# Patient Record
Sex: FEMALE | Employment: FULL TIME | URBAN - METROPOLITAN AREA
[De-identification: names, ages, dates, MRNs, and addresses within clinical notes are randomized per-mention and may not be internally consistent; named-entity substitution may affect disease eponyms.]

---

## 2023-04-01 DIAGNOSIS — K21.9 GASTROESOPHAGEAL REFLUX DISEASE WITHOUT ESOPHAGITIS: Primary | ICD-10-CM

## 2023-04-04 RX ORDER — PANTOPRAZOLE SODIUM 20 MG/1
TABLET, DELAYED RELEASE ORAL
Qty: 90 TABLET | Refills: 0 | Status: SHIPPED | OUTPATIENT
Start: 2023-04-04 | End: 2023-04-26

## 2023-04-24 DIAGNOSIS — K21.9 GASTROESOPHAGEAL REFLUX DISEASE WITHOUT ESOPHAGITIS: ICD-10-CM

## 2023-04-26 RX ORDER — PANTOPRAZOLE SODIUM 20 MG/1
TABLET, DELAYED RELEASE ORAL
Qty: 90 TABLET | Refills: 0 | Status: SHIPPED | OUTPATIENT
Start: 2023-04-26 | End: 2023-07-14

## 2023-07-13 ENCOUNTER — TELEPHONE (OUTPATIENT)
Dept: PRIMARY CARE | Facility: CLINIC | Age: 43
End: 2023-07-13
Payer: COMMERCIAL

## 2023-07-13 NOTE — TELEPHONE ENCOUNTER
Pt called she got a manicure Friday . Saturday her finger tip was inflammed and red now the whole finger is red and swollen. She was last seen by you 09/22/2022. Wants to know if an antibiotic would help pharmacy is [facility] listed in chart

## 2023-07-14 ENCOUNTER — OFFICE VISIT (OUTPATIENT)
Dept: PRIMARY CARE | Facility: CLINIC | Age: 43
End: 2023-07-14
Payer: COMMERCIAL

## 2023-07-14 VITALS
HEART RATE: 72 BPM | BODY MASS INDEX: 36.22 KG/M2 | SYSTOLIC BLOOD PRESSURE: 148 MMHG | HEIGHT: 68 IN | TEMPERATURE: 98 F | WEIGHT: 239 LBS | OXYGEN SATURATION: 98 % | RESPIRATION RATE: 18 BRPM | DIASTOLIC BLOOD PRESSURE: 86 MMHG

## 2023-07-14 DIAGNOSIS — M79.646 PAIN OF FINGER, UNSPECIFIED LATERALITY: Primary | ICD-10-CM

## 2023-07-14 PROCEDURE — 99213 OFFICE O/P EST LOW 20 MIN: CPT | Performed by: STUDENT IN AN ORGANIZED HEALTH CARE EDUCATION/TRAINING PROGRAM

## 2023-07-14 PROCEDURE — 1036F TOBACCO NON-USER: CPT | Performed by: STUDENT IN AN ORGANIZED HEALTH CARE EDUCATION/TRAINING PROGRAM

## 2023-07-14 RX ORDER — SERTRALINE HYDROCHLORIDE 100 MG/1
100 TABLET, FILM COATED ORAL DAILY
COMMUNITY
End: 2023-07-14

## 2023-07-14 RX ORDER — ARIPIPRAZOLE 5 MG/1
5 TABLET ORAL DAILY
COMMUNITY
End: 2024-02-22 | Stop reason: WASHOUT

## 2023-07-14 RX ORDER — ACYCLOVIR 200 MG/1
CAPSULE ORAL
COMMUNITY
Start: 2014-10-01 | End: 2024-03-08 | Stop reason: SDUPTHER

## 2023-07-14 RX ORDER — BUSPIRONE HYDROCHLORIDE 10 MG/1
TABLET ORAL
COMMUNITY

## 2023-07-14 ASSESSMENT — PAIN SCALES - GENERAL: PAINLEVEL: 0-NO PAIN

## 2023-07-14 NOTE — PROGRESS NOTES
"Subjective   Reason for Visit: Delicia Vazquez is an 43 y.o. female here for a Medicare Wellness visit.               HPI    Right finger: Patient states that she was having a manicure.  No significant her nail.  This got a red and some painful.  It was swollen at times.  This happened 3 days ago.  Gone down to presents no drainage.  Looks pretty fine does have some joint pain no tenderness redness or erythema was noted.    Patient Care Team:  Natalia Balderas DO as PCP - General  Natalia Balderas DO as PCP - MMO [facility] PCP     Review of Systems   All other systems reviewed and are negative.      Objective   Vitals:  /86   Pulse 72   Temp 36.7 °C (98 °F)   Resp 18   Ht 1.727 m (5' 8\")   Wt 108 kg (239 lb)   SpO2 98%   BMI 36.34 kg/m²       Physical Exam  Constitutional:       Appearance: Normal appearance.   HENT:      Head: Normocephalic and atraumatic.      Right Ear: Tympanic membrane and ear canal normal.      Left Ear: Tympanic membrane and ear canal normal.      Mouth/Throat:      Mouth: Mucous membranes are moist.      Pharynx: Oropharynx is clear.   Eyes:      Extraocular Movements: Extraocular movements intact.      Conjunctiva/sclera: Conjunctivae normal.      Pupils: Pupils are equal, round, and reactive to light.   Cardiovascular:      Rate and Rhythm: Normal rate and regular rhythm.      Pulses: Normal pulses.      Heart sounds: Normal heart sounds.   Pulmonary:      Effort: Pulmonary effort is normal.      Breath sounds: Normal breath sounds.   Abdominal:      General: Abdomen is flat. Bowel sounds are normal.      Palpations: Abdomen is soft.   Musculoskeletal:         General: Normal range of motion.      Cervical back: Normal range of motion and neck supple.   Skin:     General: Skin is warm and dry.      Capillary Refill: Capillary refill takes 2 to 3 seconds.   Neurological:      General: No focal deficit present.      Mental Status: She is alert and oriented to person, place, and time. " Mental status is at baseline.   Psychiatric:         Mood and Affect: Mood normal.         Behavior: Behavior normal.         Thought Content: Thought content normal.         Judgment: Judgment normal.         Assessment/Plan   1. Pain of finger, unspecified laterality

## 2023-11-06 DIAGNOSIS — Z30.41 ENCOUNTER FOR SURVEILLANCE OF CONTRACEPTIVE PILLS: ICD-10-CM

## 2023-11-06 RX ORDER — NORETHINDRONE 0.35 MG/1
1 TABLET ORAL DAILY
Qty: 28 TABLET | Refills: 2 | Status: SHIPPED | OUTPATIENT
Start: 2023-11-06 | End: 2024-02-22 | Stop reason: WASHOUT

## 2023-11-06 RX ORDER — NORETHINDRONE 0.35 MG/1
1 TABLET ORAL DAILY
COMMUNITY
End: 2023-11-06 | Stop reason: SDUPTHER

## 2023-11-06 NOTE — TELEPHONE ENCOUNTER
Pt. Has her annual schedule for 2/7/2024 with Dr. Tillman. She is asking for a refill of her B/C enough to get her to her appointment. She in on [address]. Her pharmacy is [facility].

## 2023-12-13 DIAGNOSIS — K21.9 GASTROESOPHAGEAL REFLUX DISEASE WITHOUT ESOPHAGITIS: ICD-10-CM

## 2023-12-13 RX ORDER — PANTOPRAZOLE SODIUM 20 MG/1
TABLET, DELAYED RELEASE ORAL
Qty: 90 TABLET | Refills: 0 | Status: SHIPPED | OUTPATIENT
Start: 2023-12-13 | End: 2024-02-22 | Stop reason: SDUPTHER

## 2024-02-01 ENCOUNTER — TELEPHONE (OUTPATIENT)
Dept: OBSTETRICS AND GYNECOLOGY | Facility: CLINIC | Age: 44
End: 2024-02-01
Payer: COMMERCIAL

## 2024-02-06 ENCOUNTER — APPOINTMENT (OUTPATIENT)
Dept: OBSTETRICS AND GYNECOLOGY | Facility: CLINIC | Age: 44
End: 2024-02-06
Payer: COMMERCIAL

## 2024-02-07 ENCOUNTER — APPOINTMENT (OUTPATIENT)
Dept: OBSTETRICS AND GYNECOLOGY | Facility: CLINIC | Age: 44
End: 2024-02-07
Payer: COMMERCIAL

## 2024-02-21 ASSESSMENT — PROMIS GLOBAL HEALTH SCALE
CARRYOUT_SOCIAL_ACTIVITIES: EXCELLENT
CARRYOUT_PHYSICAL_ACTIVITIES: COMPLETELY
RATE_PHYSICAL_HEALTH: GOOD
RATE_AVERAGE_PAIN: 1
RATE_MENTAL_HEALTH: GOOD
RATE_QUALITY_OF_LIFE: VERY GOOD
RATE_SOCIAL_SATISFACTION: VERY GOOD
RATE_GENERAL_HEALTH: GOOD
EMOTIONAL_PROBLEMS: SOMETIMES

## 2024-02-22 ENCOUNTER — OFFICE VISIT (OUTPATIENT)
Dept: PRIMARY CARE | Facility: CLINIC | Age: 44
End: 2024-02-22
Payer: COMMERCIAL

## 2024-02-22 VITALS
WEIGHT: 257 LBS | DIASTOLIC BLOOD PRESSURE: 88 MMHG | OXYGEN SATURATION: 99 % | HEIGHT: 68 IN | HEART RATE: 90 BPM | SYSTOLIC BLOOD PRESSURE: 130 MMHG | BODY MASS INDEX: 38.95 KG/M2

## 2024-02-22 DIAGNOSIS — K21.9 GASTROESOPHAGEAL REFLUX DISEASE WITHOUT ESOPHAGITIS: ICD-10-CM

## 2024-02-22 DIAGNOSIS — Z13.220 LIPID SCREENING: ICD-10-CM

## 2024-02-22 DIAGNOSIS — Z12.31 BREAST CANCER SCREENING BY MAMMOGRAM: ICD-10-CM

## 2024-02-22 DIAGNOSIS — Z13.29 THYROID DISORDER SCREENING: ICD-10-CM

## 2024-02-22 DIAGNOSIS — Z00.00 ANNUAL PHYSICAL EXAM: Primary | ICD-10-CM

## 2024-02-22 DIAGNOSIS — R73.9 HYPERGLYCEMIA: ICD-10-CM

## 2024-02-22 LAB
ALBUMIN SERPL BCP-MCNC: 4.7 G/DL (ref 3.4–5)
ALP SERPL-CCNC: 47 U/L (ref 33–110)
ALT SERPL W P-5'-P-CCNC: 47 U/L (ref 7–45)
ANION GAP SERPL CALC-SCNC: 18 MMOL/L (ref 10–20)
AST SERPL W P-5'-P-CCNC: 37 U/L (ref 9–39)
BILIRUB SERPL-MCNC: 0.6 MG/DL (ref 0–1.2)
BUN SERPL-MCNC: 12 MG/DL (ref 6–23)
CALCIUM SERPL-MCNC: 9.2 MG/DL (ref 8.6–10.6)
CHLORIDE SERPL-SCNC: 101 MMOL/L (ref 98–107)
CHOLEST SERPL-MCNC: 213 MG/DL (ref 0–199)
CHOLESTEROL/HDL RATIO: 5.1
CO2 SERPL-SCNC: 24 MMOL/L (ref 21–32)
CREAT SERPL-MCNC: 0.66 MG/DL (ref 0.5–1.05)
EGFRCR SERPLBLD CKD-EPI 2021: >90 ML/MIN/1.73M*2
ERYTHROCYTE [DISTWIDTH] IN BLOOD BY AUTOMATED COUNT: 15.9 % (ref 11.5–14.5)
EST. AVERAGE GLUCOSE BLD GHB EST-MCNC: 114 MG/DL
GLUCOSE SERPL-MCNC: 86 MG/DL (ref 74–99)
HBA1C MFR BLD: 5.6 %
HCT VFR BLD AUTO: 39.2 % (ref 36–46)
HDLC SERPL-MCNC: 41.8 MG/DL
HGB BLD-MCNC: 12.6 G/DL (ref 12–16)
LDLC SERPL CALC-MCNC: 139 MG/DL
MCH RBC QN AUTO: 27 PG (ref 26–34)
MCHC RBC AUTO-ENTMCNC: 32.1 G/DL (ref 32–36)
MCV RBC AUTO: 84 FL (ref 80–100)
NON HDL CHOLESTEROL: 171 MG/DL (ref 0–149)
NRBC BLD-RTO: 0 /100 WBCS (ref 0–0)
PLATELET # BLD AUTO: 294 X10*3/UL (ref 150–450)
POTASSIUM SERPL-SCNC: 4.5 MMOL/L (ref 3.5–5.3)
PROT SERPL-MCNC: 7.2 G/DL (ref 6.4–8.2)
RBC # BLD AUTO: 4.66 X10*6/UL (ref 4–5.2)
SODIUM SERPL-SCNC: 138 MMOL/L (ref 136–145)
TRIGL SERPL-MCNC: 162 MG/DL (ref 0–149)
TSH SERPL-ACNC: 2.09 MIU/L (ref 0.44–3.98)
VLDL: 32 MG/DL (ref 0–40)
WBC # BLD AUTO: 6.8 X10*3/UL (ref 4.4–11.3)

## 2024-02-22 PROCEDURE — 85027 COMPLETE CBC AUTOMATED: CPT

## 2024-02-22 PROCEDURE — 1036F TOBACCO NON-USER: CPT | Performed by: STUDENT IN AN ORGANIZED HEALTH CARE EDUCATION/TRAINING PROGRAM

## 2024-02-22 PROCEDURE — 3008F BODY MASS INDEX DOCD: CPT | Performed by: STUDENT IN AN ORGANIZED HEALTH CARE EDUCATION/TRAINING PROGRAM

## 2024-02-22 PROCEDURE — 36415 COLL VENOUS BLD VENIPUNCTURE: CPT

## 2024-02-22 PROCEDURE — 83036 HEMOGLOBIN GLYCOSYLATED A1C: CPT

## 2024-02-22 PROCEDURE — 80061 LIPID PANEL: CPT

## 2024-02-22 PROCEDURE — 99396 PREV VISIT EST AGE 40-64: CPT | Performed by: STUDENT IN AN ORGANIZED HEALTH CARE EDUCATION/TRAINING PROGRAM

## 2024-02-22 PROCEDURE — 80053 COMPREHEN METABOLIC PANEL: CPT

## 2024-02-22 PROCEDURE — 84443 ASSAY THYROID STIM HORMONE: CPT

## 2024-02-22 RX ORDER — PANTOPRAZOLE SODIUM 20 MG/1
20 TABLET, DELAYED RELEASE ORAL DAILY
Qty: 90 TABLET | Refills: 3 | Status: SHIPPED | OUTPATIENT
Start: 2024-02-22

## 2024-02-22 RX ORDER — BUPROPION HYDROCHLORIDE 150 MG/1
150 TABLET ORAL DAILY
COMMUNITY

## 2024-02-22 RX ORDER — DESVENLAFAXINE SUCCINATE 50 MG/1
50 TABLET, EXTENDED RELEASE ORAL DAILY
COMMUNITY
Start: 2024-02-13

## 2024-02-22 ASSESSMENT — ENCOUNTER SYMPTOMS
PALPITATIONS: 0
FATIGUE: 0
UNEXPECTED WEIGHT CHANGE: 0
WHEEZING: 0
SHORTNESS OF BREATH: 0
ABDOMINAL PAIN: 0
LIGHT-HEADEDNESS: 0
SINUS PRESSURE: 0
SLEEP DISTURBANCE: 0
DYSPHORIC MOOD: 0
CONSTIPATION: 0
DIARRHEA: 0
CHEST TIGHTNESS: 0
NERVOUS/ANXIOUS: 0
DIZZINESS: 0
HEADACHES: 0
DIFFICULTY URINATING: 0

## 2024-02-22 ASSESSMENT — PATIENT HEALTH QUESTIONNAIRE - PHQ9
1. LITTLE INTEREST OR PLEASURE IN DOING THINGS: NOT AT ALL
SUM OF ALL RESPONSES TO PHQ9 QUESTIONS 1 AND 2: 0
2. FEELING DOWN, DEPRESSED OR HOPELESS: NOT AT ALL

## 2024-02-22 NOTE — PROGRESS NOTES
Subjective   Patient ID: Delicia Vazquez is a 43 y.o. female who presents for Annual Exam (Annual physical /Fasting bloodwork- Would like it forwarded to psych Ryder trimble /She has fax # to provide to me /Is running out of birth control- Is not sexually active, is thinking about just stopping it. Wants your advice if she can stop taking it. ).  Working on weight loss. Her mental health is very connected with her eating habits.     Fasting for labs.     Sees a psychiatrist. Did Tempus testing and is now on pristiq and wellbutrin. Going well so far.     Interested in seeing how she does off of birth control.     Sleep is good.     Exercises regularly.     No family hx CAD.     Chronic dry nose.     No family hx of colon or breast cancer.         Review of Systems   Constitutional:  Negative for fatigue and unexpected weight change.   HENT:  Negative for congestion, ear pain and sinus pressure.    Eyes:  Negative for visual disturbance.   Respiratory:  Negative for chest tightness, shortness of breath and wheezing.    Cardiovascular:  Negative for chest pain, palpitations and leg swelling.   Gastrointestinal:  Negative for abdominal pain, constipation and diarrhea.   Genitourinary:  Negative for difficulty urinating.   Skin:  Negative for rash.   Neurological:  Negative for dizziness, light-headedness and headaches.   Psychiatric/Behavioral:  Negative for dysphoric mood and sleep disturbance. The patient is not nervous/anxious.    All other systems reviewed and are negative.      Objective   Physical Exam  Vitals reviewed.   Constitutional:       General: She is not in acute distress.  HENT:      Head: Normocephalic.      Right Ear: External ear normal.      Left Ear: External ear normal.      Nose: Nose normal.   Eyes:      Extraocular Movements: Extraocular movements intact.      Pupils: Pupils are equal, round, and reactive to light.   Cardiovascular:      Rate and Rhythm: Normal rate and regular rhythm.      Heart  sounds: Normal heart sounds.   Pulmonary:      Effort: Pulmonary effort is normal.      Breath sounds: Normal breath sounds.   Abdominal:      Palpations: Abdomen is soft.      Tenderness: There is no abdominal tenderness. There is no guarding.   Musculoskeletal:         General: Normal range of motion.      Cervical back: Normal range of motion and neck supple.   Skin:     General: Skin is warm and dry.   Neurological:      Mental Status: She is alert. Mental status is at baseline.   Psychiatric:         Mood and Affect: Mood normal.         Behavior: Behavior normal.         Body mass index is 39.08 kg/m².      Current Outpatient Medications:     acyclovir (Zovirax) 200 mg capsule, Take by mouth once daily., Disp: , Rfl:     buPROPion XL (Wellbutrin XL) 150 mg 24 hr tablet, Take 1 tablet (150 mg) by mouth once daily., Disp: , Rfl:     busPIRone (Buspar) 10 mg tablet, TAKE 1 TABLET BY MOUTH 3 TIMES A DAY AS DIRECTED, Disp: , Rfl:     cetirizine (ZyrTEC) 10 mg capsule, Take 1 capsule (10 mg) by mouth once daily., Disp: , Rfl:     desvenlafaxine 50 mg 24 hr tablet, Take 1 tablet (50 mg) by mouth once daily., Disp: , Rfl:     pantoprazole (ProtoNix) 20 mg EC tablet, Take 1 tablet (20 mg) by mouth once daily. Do not crush, chew, or split., Disp: 90 tablet, Rfl: 3      Assessment/Plan   Diagnoses and all orders for this visit:  Annual physical exam  Comments:  mammogram ordered  cologuard next year  gyn appt soon for pap  Orders:  -     Comprehensive Metabolic Panel  -     CBC  Gastroesophageal reflux disease without esophagitis  -     pantoprazole (ProtoNix) 20 mg EC tablet; Take 1 tablet (20 mg) by mouth once daily. Do not crush, chew, or split.  Lipid screening  -     Lipid Panel  Thyroid disorder screening  -     TSH with reflex to Free T4 if abnormal  Breast cancer screening by mammogram  -     BI mammo bilateral screening tomosynthesis; Future  Hyperglycemia  -     Hemoglobin A1c  BMI 39.0-39.9,adult    Follow up  annually       Natalia Balderas,  02/22/24 9:09 AM

## 2024-03-06 ENCOUNTER — HOSPITAL ENCOUNTER (OUTPATIENT)
Dept: RADIOLOGY | Facility: CLINIC | Age: 44
Discharge: HOME | End: 2024-03-06
Payer: COMMERCIAL

## 2024-03-06 DIAGNOSIS — Z12.31 BREAST CANCER SCREENING BY MAMMOGRAM: ICD-10-CM

## 2024-03-06 PROCEDURE — 77067 SCR MAMMO BI INCL CAD: CPT

## 2024-03-06 PROCEDURE — 77067 SCR MAMMO BI INCL CAD: CPT | Performed by: RADIOLOGY

## 2024-03-06 PROCEDURE — 77063 BREAST TOMOSYNTHESIS BI: CPT | Performed by: RADIOLOGY

## 2024-03-08 ENCOUNTER — PATIENT MESSAGE (OUTPATIENT)
Dept: PRIMARY CARE | Facility: CLINIC | Age: 44
End: 2024-03-08
Payer: COMMERCIAL

## 2024-03-08 DIAGNOSIS — Z86.19 H/O COLD SORES: Primary | ICD-10-CM

## 2024-03-08 RX ORDER — ACYCLOVIR 200 MG/1
200 CAPSULE ORAL
Qty: 90 CAPSULE | Refills: 0 | Status: SHIPPED | OUTPATIENT
Start: 2024-03-08 | End: 2024-05-29 | Stop reason: SDUPTHER

## 2024-03-08 NOTE — TELEPHONE ENCOUNTER
From: Fabrice Vazquez  To: Natalia Balderas DO  Sent: 3/8/2024 10:12 AM EST  Subject: Request refill acyclovir 200mg    Hello can you please call in a refill of my acyclovir 200mg. My last rx refill  date  in 2023. Thank you! Pharmacy is [facility] in [facility].    ~fabrice

## 2024-04-03 ENCOUNTER — APPOINTMENT (OUTPATIENT)
Dept: OBSTETRICS AND GYNECOLOGY | Facility: CLINIC | Age: 44
End: 2024-04-03
Payer: COMMERCIAL

## 2024-05-09 ENCOUNTER — APPOINTMENT (OUTPATIENT)
Dept: OBSTETRICS AND GYNECOLOGY | Facility: CLINIC | Age: 44
End: 2024-05-09
Payer: COMMERCIAL

## 2024-05-29 ENCOUNTER — OFFICE VISIT (OUTPATIENT)
Dept: OBSTETRICS AND GYNECOLOGY | Facility: CLINIC | Age: 44
End: 2024-05-29
Payer: COMMERCIAL

## 2024-05-29 ENCOUNTER — LAB (OUTPATIENT)
Dept: LAB | Facility: LAB | Age: 44
End: 2024-05-29
Payer: COMMERCIAL

## 2024-05-29 VITALS
DIASTOLIC BLOOD PRESSURE: 84 MMHG | HEIGHT: 68 IN | WEIGHT: 237 LBS | BODY MASS INDEX: 35.92 KG/M2 | SYSTOLIC BLOOD PRESSURE: 148 MMHG

## 2024-05-29 DIAGNOSIS — Z12.31 VISIT FOR SCREENING MAMMOGRAM: ICD-10-CM

## 2024-05-29 DIAGNOSIS — Z11.3 SCREENING EXAMINATION FOR STI: Primary | ICD-10-CM

## 2024-05-29 DIAGNOSIS — Z11.3 SCREENING EXAMINATION FOR STI: ICD-10-CM

## 2024-05-29 DIAGNOSIS — Z86.19 H/O COLD SORES: ICD-10-CM

## 2024-05-29 DIAGNOSIS — Z01.419 WELL FEMALE EXAM WITH ROUTINE GYNECOLOGICAL EXAM: ICD-10-CM

## 2024-05-29 PROCEDURE — 87591 N.GONORRHOEAE DNA AMP PROB: CPT

## 2024-05-29 PROCEDURE — 87491 CHLMYD TRACH DNA AMP PROBE: CPT

## 2024-05-29 PROCEDURE — 87661 TRICHOMONAS VAGINALIS AMPLIF: CPT

## 2024-05-29 PROCEDURE — 99396 PREV VISIT EST AGE 40-64: CPT | Performed by: OBSTETRICS & GYNECOLOGY

## 2024-05-29 PROCEDURE — 86803 HEPATITIS C AB TEST: CPT

## 2024-05-29 PROCEDURE — 86780 TREPONEMA PALLIDUM: CPT

## 2024-05-29 PROCEDURE — 88175 CYTOPATH C/V AUTO FLUID REDO: CPT

## 2024-05-29 PROCEDURE — 87340 HEPATITIS B SURFACE AG IA: CPT

## 2024-05-29 PROCEDURE — 36415 COLL VENOUS BLD VENIPUNCTURE: CPT

## 2024-05-29 PROCEDURE — 3008F BODY MASS INDEX DOCD: CPT | Performed by: OBSTETRICS & GYNECOLOGY

## 2024-05-29 PROCEDURE — 87389 HIV-1 AG W/HIV-1&-2 AB AG IA: CPT

## 2024-05-29 PROCEDURE — 87624 HPV HI-RISK TYP POOLED RSLT: CPT

## 2024-05-29 RX ORDER — ACYCLOVIR 200 MG/1
200 CAPSULE ORAL
Qty: 90 CAPSULE | Refills: 3 | Status: SHIPPED | OUTPATIENT
Start: 2024-05-29

## 2024-05-29 NOTE — PROGRESS NOTES
Subjective   Patient ID: Delicia Vazquez is a 44 y.o. female who presents for Annual Exam.    Last pap: 9/2/20 normal HPV- ([facility])  Last mamm: 3/6/24, ordered for next year  LMP: 05/06/2024. Menses reg   Contraception: condoms  Sexually active: yes  Self breast exam: yes  Diet: balanced  Exercise: yes    HPI  Doing well. No complaints.  Wants all sti screening and refill herpes meds      Review of Systems  Neg   Objective   Physical Exam    Physical Exam         Appearance: Normal appearance. Affect normal and alert  Pulmonary:      Effort: Pulmonary effort is normal. Breath sounds clear  Skin: no rashes or lesions   Breasts:     Breasts bilaterally are symmetrical. No masses or axillary adenopathy. No skin or nipple changes    Abdominal:     Abdomen is flat, soft, nontender. No distension. No mass palpated.      Genitourinary:     Labia: no skin lesions or rash       Urethra: No lesions.      Bladder with no prolapse     Vagina: No discharge, mucosa is pink with no lesions.     Cervix:    mobile and nontender no discharge     Uterus:   Not enlarged, small, nontender.      Adnexa: Bilaterally with  No mass or tenderness.            Extremities:  Nontender, no edema. Normal range of motion    Assessment/Plan   Diagnoses and all orders for this visit:  Screening examination for STI  -     Syphilis Screen with Reflex; Future  -     HIV 1/2 Antigen/Antibody Screen with Reflex to Confirmation; Future  -     Hepatitis BsAg; Future  -     Hepatitis C Antibody; Future  Visit for screening mammogram  -     BI mammo bilateral screening tomosynthesis; Future  Well female exam with routine gynecological exam  -     THINPREP PAP/ GC CHLY   H/O cold sores  -     acyclovir (Zovirax) 200 mg capsule; Take 1 capsule (200 mg) by mouth once daily.         MD Ivana Harris, [facility] 05/29/24 3:02 PM

## 2024-05-30 LAB
HBV SURFACE AG SERPL QL IA: NONREACTIVE
HCV AB SER QL: NONREACTIVE
HIV 1+2 AB+HIV1 P24 AG SERPL QL IA: NONREACTIVE
TREPONEMA PALLIDUM IGG+IGM AB [PRESENCE] IN SERUM OR PLASMA BY IMMUNOASSAY: NONREACTIVE

## 2024-05-31 LAB
C TRACH RRNA SPEC QL NAA+PROBE: NEGATIVE
N GONORRHOEA DNA SPEC QL PROBE+SIG AMP: NEGATIVE
T VAGINALIS RRNA SPEC QL NAA+PROBE: NEGATIVE

## 2024-06-11 LAB
CYTOLOGY CMNT CVX/VAG CYTO-IMP: NORMAL
HPV HR 12 DNA GENITAL QL NAA+PROBE: POSITIVE
HPV HR GENOTYPES PNL CVX NAA+PROBE: POSITIVE
HPV16 DNA SPEC QL NAA+PROBE: NEGATIVE
HPV18 DNA SPEC QL NAA+PROBE: POSITIVE
LAB AP HPV GENOTYPE QUESTION: YES
LAB AP HPV HR: NORMAL
LAB AP PAP ADDITIONAL TESTS: NORMAL
LABORATORY COMMENT REPORT: NORMAL
PATH REPORT.TOTAL CANCER: NORMAL

## 2024-06-16 ENCOUNTER — PATIENT MESSAGE (OUTPATIENT)
Dept: OBSTETRICS AND GYNECOLOGY | Facility: CLINIC | Age: 44
End: 2024-06-16
Payer: COMMERCIAL

## 2024-06-16 DIAGNOSIS — L73.9 FOLLICULITIS: Primary | ICD-10-CM

## 2024-06-20 ENCOUNTER — TELEPHONE (OUTPATIENT)
Dept: OBSTETRICS AND GYNECOLOGY | Facility: CLINIC | Age: 44
End: 2024-06-20
Payer: COMMERCIAL

## 2024-06-20 NOTE — TELEPHONE ENCOUNTER
----- Message from Kristy Hung MD sent at 6/19/2024  9:31 PM EDT -----    Please call this patient. Her PAP results were abnormal. She needs to make an appointment for a colposcopy.        Kristy Hung MD

## 2024-06-26 ENCOUNTER — TELEPHONE (OUTPATIENT)
Dept: OBSTETRICS AND GYNECOLOGY | Facility: CLINIC | Age: 44
End: 2024-06-26
Payer: COMMERCIAL

## 2024-07-01 ENCOUNTER — TELEPHONE (OUTPATIENT)
Dept: OBSTETRICS AND GYNECOLOGY | Facility: CLINIC | Age: 44
End: 2024-07-01
Payer: COMMERCIAL

## 2024-07-01 RX ORDER — CEPHALEXIN 500 MG/1
500 CAPSULE ORAL 2 TIMES DAILY
Qty: 14 CAPSULE | Refills: 0 | Status: SHIPPED | OUTPATIENT
Start: 2024-07-01 | End: 2024-07-08

## 2024-07-01 NOTE — TELEPHONE ENCOUNTER
----- Message from Kristy Hung MD sent at 7/1/2024  2:08 PM EDT -----  Regarding: FW: Bump/cyst found  Contact: [phone]  To start Keflex today 500 BID rx sent    Kristy Hung MD    ----- Message -----  From: Ivana Bender CMA  Sent: 7/1/2024   7:13 AM EDT  To: Kristy Hung MD  Subject: FW: Bump/cyst found                              Does she need to be seen before 7/9/24?  ----- Message -----  From: Delicia Vazquez  Sent: 6/29/2024   9:11 PM EDT  To:  [address] Clinical Support Staff  Subject: Bump/cyst found                                  Hello,  I’m trying to figure out if I need to seek immediate medical attention for this cyst. I have been soaking and applying warm compresses and tea bags. I don’t have an appointment with Dr hung until July 9th. Can you advise please?

## 2024-07-01 NOTE — TELEPHONE ENCOUNTER
----- Message from Kristy Hung MD sent at 7/1/2024  2:08 PM EDT -----  Regarding: FW: Bump/cyst found  Contact: [phone]  To start Keflex today 500 BID rx sent    Kristy Hung MD    ----- Message -----  From: Ivana Bender CMA  Sent: 7/1/2024   7:13 AM EDT  To: Kristy Hung MD  Subject: FW: Bump/cyst found                              Does she need to be seen before 7/9/24?  ----- Message -----  From: Delicia Vazquez  Sent: 6/29/2024   9:11 PM EDT  To:  [address] Clinical Support Staff  Subject: Bump/cyst found                                  Hello,  I’m trying to figure out if I need to seek immediate medical attention for this cyst. I have been soaking and applying warm compresses and tea bags. I don’t have an appointment with Dr hung until July 9th. Can you advise please?

## 2024-07-09 ENCOUNTER — APPOINTMENT (OUTPATIENT)
Dept: OBSTETRICS AND GYNECOLOGY | Facility: CLINIC | Age: 44
End: 2024-07-09
Payer: COMMERCIAL

## 2024-07-09 VITALS
SYSTOLIC BLOOD PRESSURE: 158 MMHG | HEIGHT: 68 IN | WEIGHT: 245.4 LBS | BODY MASS INDEX: 37.19 KG/M2 | DIASTOLIC BLOOD PRESSURE: 90 MMHG

## 2024-07-09 DIAGNOSIS — L73.9 FOLLICULITIS: ICD-10-CM

## 2024-07-09 DIAGNOSIS — B97.7 HPV IN FEMALE: ICD-10-CM

## 2024-07-09 DIAGNOSIS — R87.810 HUMAN PAPILLOMAVIRUS (HPV) TYPE 18 DNA DETECTED IN CERVICAL SPECIMEN: Primary | ICD-10-CM

## 2024-07-09 DIAGNOSIS — N83.201 CYST OF RIGHT OVARY: ICD-10-CM

## 2024-07-09 LAB — PREGNANCY TEST URINE, POC: NEGATIVE

## 2024-07-09 PROCEDURE — 88305 TISSUE EXAM BY PATHOLOGIST: CPT

## 2024-07-09 PROCEDURE — 81025 URINE PREGNANCY TEST: CPT | Performed by: OBSTETRICS & GYNECOLOGY

## 2024-07-09 PROCEDURE — 99213 OFFICE O/P EST LOW 20 MIN: CPT | Performed by: OBSTETRICS & GYNECOLOGY

## 2024-07-09 PROCEDURE — 57454 BX/CURETT OF CERVIX W/SCOPE: CPT | Performed by: OBSTETRICS & GYNECOLOGY

## 2024-07-09 NOTE — PROGRESS NOTES
Patient ID: Delicia Vazquez is a 44 y.o. female. Pap on 5/29/24 normal HPV+(18 and other).  Had trt x 7 days for folliculitis on rt vulva-  exam: this has healed.    [facility] week for RLQ pain. CT scan reviewed.  = 4 cm cyst in ovary. Appendix was normal.   Pt on Naproxen .   Cyst will shrink after 1-2 menses. Call if severe pain recurs.        Colposcopy    Date/Time: 7/9/2024 12:56 PM    Performed by: Kristy Hung MD  Authorized by: Kristy Hung MD    Consent:     Patient questions answered: yes      Risks and benefits of the procedure and its alternatives discussed: yes      Consent obtained:  Written and verbal    Consent given by:  Patient  Indication:     Other indication(s): clinical abnormality    Pre-procedure:     Speculum was placed in the vagina: yes      Prep solution(s): acetic acid    Procedure:     Colposcopy with: colposcopy only and endocervical curettage      Colposcopy details:  No lesions seen     Cervix visibility: fully visualized      SCJ visibility: fully visualized    Post-procedure:     Patient tolerance of procedure:  Patient tolerated the procedure well with no immediate complications    Instructions and paperwork completed: yes    Comments:      Follow up Pap in 1 yr due to HPV 18 positive.       Kristy Hung MD

## 2024-07-16 LAB
LABORATORY COMMENT REPORT: NORMAL
PATH REPORT.FINAL DX SPEC: NORMAL
PATH REPORT.GROSS SPEC: NORMAL
PATH REPORT.RELEVANT HX SPEC: NORMAL
PATH REPORT.TOTAL CANCER: NORMAL

## 2024-09-11 ENCOUNTER — APPOINTMENT (OUTPATIENT)
Dept: OBSTETRICS AND GYNECOLOGY | Facility: CLINIC | Age: 44
End: 2024-09-11
Payer: COMMERCIAL

## 2024-09-27 ENCOUNTER — OFFICE VISIT (OUTPATIENT)
Dept: URGENT CARE | Age: 44
End: 2024-09-27
Payer: COMMERCIAL

## 2024-09-27 VITALS
RESPIRATION RATE: 14 BRPM | WEIGHT: 240 LBS | OXYGEN SATURATION: 100 % | TEMPERATURE: 97.5 F | BODY MASS INDEX: 36.49 KG/M2 | HEART RATE: 76 BPM | DIASTOLIC BLOOD PRESSURE: 91 MMHG | SYSTOLIC BLOOD PRESSURE: 178 MMHG

## 2024-09-27 DIAGNOSIS — H92.01 OTALGIA OF RIGHT EAR: Primary | ICD-10-CM

## 2024-09-27 ASSESSMENT — PAIN SCALES - GENERAL: PAINLEVEL: 3

## 2024-09-27 NOTE — PROGRESS NOTES
Subjective   Patient ID: Delicia Vazquez is a 44 y.o. female. They present today with a chief complaint of Foreign Body in Ear (Cotton part of Q-tip stuck in (R) ear X 3 days. ).    History of Present Illness    Foreign Body in Ear  Associated symptoms: ear pain        Patient reports possible foreign body in right ear. She states she cleaned out her ears three days ago and believed the cotton part of Q tip is stuck in her ear.     Past Medical History  Allergies as of 09/27/2024 - Reviewed 09/27/2024   Allergen Reaction Noted    Penicillins Rash 10/01/2014       (Not in a hospital admission)       Past Medical History:   Diagnosis Date    Acute pharyngitis, unspecified 03/01/2022    Pharyngitis with viral syndrome    Acute serous otitis media, bilateral 12/02/2019    Bilateral acute serous otitis media    Acute upper respiratory infection, unspecified 12/02/2019    Acute upper respiratory infection    COVID-19 12/15/2021    COVID-19 virus infection    Left lower quadrant pain 08/17/2019    LLQ discomfort    Morbid (severe) obesity due to excess calories (Multi) 09/19/2022    Class 3 severe obesity with body mass index (BMI) of 40.0 to 44.9 in adult, unspecified obesity type, unspecified whether serious comorbidity present    Other acute sinusitis 05/23/2017    Other acute sinusitis    Postnasal drip 03/01/2022    Post-nasal drainage    Unspecified symptoms and signs involving the genitourinary system 08/17/2019    UTI symptoms       Past Surgical History:   Procedure Laterality Date    OTHER SURGICAL HISTORY  03/23/2015    Primary Repair Of Ruptured Achilles Tendon        reports that she has never smoked. She has never been exposed to tobacco smoke. She has never used smokeless tobacco. She reports current alcohol use of about 2.0 standard drinks of alcohol per week. She reports that she does not use drugs.    Review of Systems  Review of Systems   HENT:  Positive for ear pain.                                    Objective    Vitals:    09/27/24 0905   BP: (!) 178/91   Pulse: 76   Resp: 14   Temp: 36.4 °C (97.5 °F)   SpO2: 100%   Weight: 109 kg (240 lb)     No LMP recorded.    Physical Exam  Constitutional:       Appearance: Normal appearance.   HENT:      Head: Normocephalic.      Right Ear: Tympanic membrane, ear canal and external ear normal.      Left Ear: Tympanic membrane, ear canal and external ear normal.      Ears:      Comments: No foreign body visualized in right ear canal.   Neurological:      Mental Status: She is alert.         Procedures    Point of Care Test & Imaging Results from this visit  No results found for this visit on 09/27/24.   No results found.    Diagnostic study results (if any) were reviewed by TIA Mendoza.    Assessment/Plan   Allergies, medications, history, and pertinent labs/EKGs/Imaging reviewed by TIA Mendoza.     Medical Decision Making  Patient presents to urgent care for complaints of possible foreign body in right ear. No foreign body visualized on exam. Right ear was lightly irrigated and no foreign body was removed. Patient denies any pain after irrigation. Follow up as needed.     Orders and Diagnoses  There are no diagnoses linked to this encounter.    Medical Admin Record      Patient disposition: Home    Electronically signed by TIA Mendoza  9:13 AM

## 2024-10-22 ENCOUNTER — TELEPHONE (OUTPATIENT)
Dept: OBSTETRICS AND GYNECOLOGY | Facility: CLINIC | Age: 44
End: 2024-10-22
Payer: COMMERCIAL

## 2024-10-22 ENCOUNTER — OFFICE VISIT (OUTPATIENT)
Dept: URGENT CARE | Age: 44
End: 2024-10-22
Payer: COMMERCIAL

## 2024-10-22 VITALS
WEIGHT: 253 LBS | DIASTOLIC BLOOD PRESSURE: 79 MMHG | TEMPERATURE: 97.3 F | RESPIRATION RATE: 18 BRPM | SYSTOLIC BLOOD PRESSURE: 159 MMHG | OXYGEN SATURATION: 98 % | HEART RATE: 87 BPM | BODY MASS INDEX: 38.47 KG/M2

## 2024-10-22 DIAGNOSIS — R10.30 LOWER ABDOMINAL PAIN: ICD-10-CM

## 2024-10-22 DIAGNOSIS — R10.32 LEFT LOWER QUADRANT ABDOMINAL PAIN: Primary | ICD-10-CM

## 2024-10-22 LAB
POC APPEARANCE, URINE: CLEAR
POC BILIRUBIN, URINE: NEGATIVE
POC BLOOD, URINE: NEGATIVE
POC COLOR, URINE: YELLOW
POC GLUCOSE, URINE: NEGATIVE MG/DL
POC KETONES, URINE: NEGATIVE MG/DL
POC LEUKOCYTES, URINE: NEGATIVE
POC NITRITE,URINE: NEGATIVE
POC PH, URINE: 6.5 PH
POC PROTEIN, URINE: NEGATIVE MG/DL
POC SPECIFIC GRAVITY, URINE: 1.01
POC UROBILINOGEN, URINE: 0.2 EU/DL
PREGNANCY TEST URINE, POC: NEGATIVE

## 2024-10-22 PROCEDURE — 99213 OFFICE O/P EST LOW 20 MIN: CPT | Performed by: STUDENT IN AN ORGANIZED HEALTH CARE EDUCATION/TRAINING PROGRAM

## 2024-10-22 PROCEDURE — 81003 URINALYSIS AUTO W/O SCOPE: CPT | Performed by: STUDENT IN AN ORGANIZED HEALTH CARE EDUCATION/TRAINING PROGRAM

## 2024-10-22 PROCEDURE — 81025 URINE PREGNANCY TEST: CPT | Performed by: STUDENT IN AN ORGANIZED HEALTH CARE EDUCATION/TRAINING PROGRAM

## 2024-10-22 RX ORDER — METRONIDAZOLE 250 MG/1
250 TABLET ORAL 3 TIMES DAILY
Qty: 21 TABLET | Refills: 0 | Status: SHIPPED | OUTPATIENT
Start: 2024-10-22 | End: 2024-10-29

## 2024-10-22 RX ORDER — SULFAMETHOXAZOLE AND TRIMETHOPRIM 800; 160 MG/1; MG/1
1 TABLET ORAL 2 TIMES DAILY
Qty: 14 TABLET | Refills: 0 | Status: SHIPPED | OUTPATIENT
Start: 2024-10-22 | End: 2024-10-29

## 2024-10-22 ASSESSMENT — ENCOUNTER SYMPTOMS
DYSURIA: 0
FLANK PAIN: 1
ABDOMINAL PAIN: 1
FREQUENCY: 0
CARDIOVASCULAR NEGATIVE: 1
HEMATURIA: 0
RESPIRATORY NEGATIVE: 1

## 2024-10-22 ASSESSMENT — PAIN SCALES - GENERAL: PAINLEVEL_OUTOF10: 4

## 2024-10-22 NOTE — PROGRESS NOTES
Subjective   Patient ID: Delicia Vazquez is a 44 y.o. female. They present today with a chief complaint of Abdominal Pain (Left sided mid-abd pain, bloating X 6 days. ).    History of Present Illness    Abdominal Pain  Pertinent negatives include no dysuria, frequency or hematuria.     Patient presents to the urgent care for a chief complaint of left flank plain bloating and generalized lower abdominal pain greater on the left side, denies any pelvic pain or concern for STI, denies any vaginal bleeding.  No report of any fevers chills vomiting or diarrhea.  Patient does report she did have a ovarian cyst no surgical management but believes on right side.  Patient denies any irritative urinary symptoms    Past Medical History  Allergies as of 10/22/2024 - Reviewed 10/22/2024   Allergen Reaction Noted    Penicillins Rash 10/01/2014       (Not in a hospital admission)       Past Medical History:   Diagnosis Date    Acute pharyngitis, unspecified 03/01/2022    Pharyngitis with viral syndrome    Acute serous otitis media, bilateral 12/02/2019    Bilateral acute serous otitis media    Acute upper respiratory infection, unspecified 12/02/2019    Acute upper respiratory infection    COVID-19 12/15/2021    COVID-19 virus infection    Left lower quadrant pain 08/17/2019    LLQ discomfort    Morbid (severe) obesity due to excess calories (Multi) 09/19/2022    Class 3 severe obesity with body mass index (BMI) of 40.0 to 44.9 in adult, unspecified obesity type, unspecified whether serious comorbidity present    Other acute sinusitis 05/23/2017    Other acute sinusitis    Postnasal drip 03/01/2022    Post-nasal drainage    Unspecified symptoms and signs involving the genitourinary system 08/17/2019    UTI symptoms       Past Surgical History:   Procedure Laterality Date    OTHER SURGICAL HISTORY  03/23/2015    Primary Repair Of Ruptured Achilles Tendon        reports that she has never smoked. She has never been exposed to tobacco  smoke. She has never used smokeless tobacco. She reports current alcohol use of about 2.0 standard drinks of alcohol per week. She reports that she does not use drugs.    Review of Systems  Review of Systems   Respiratory: Negative.     Cardiovascular: Negative.    Gastrointestinal:  Positive for abdominal pain.   Genitourinary:  Positive for flank pain. Negative for dysuria, frequency, genital sores, hematuria, menstrual problem, pelvic pain, urgency, vaginal bleeding, vaginal discharge and vaginal pain.   All other systems reviewed and are negative.                                 Objective    Vitals:    10/22/24 1056   BP: 159/79   Pulse: 87   Resp: 18   Temp: 36.3 °C (97.3 °F)   SpO2: 98%   Weight: 115 kg (253 lb)     No LMP recorded.    Physical Exam  Vitals and nursing note reviewed.   Constitutional:       General: She is not in acute distress.     Appearance: Normal appearance. She is not ill-appearing, toxic-appearing or diaphoretic.   Cardiovascular:      Rate and Rhythm: Normal rate and regular rhythm.      Pulses: Normal pulses.      Heart sounds: Normal heart sounds. No murmur heard.     No friction rub.   Pulmonary:      Effort: Pulmonary effort is normal. No respiratory distress.      Breath sounds: Normal breath sounds.   Abdominal:      General: Abdomen is flat. Bowel sounds are normal. There is no distension.      Palpations: Abdomen is soft. There is no mass.      Tenderness: There is abdominal tenderness. There is no right CVA tenderness, guarding or rebound.      Hernia: No hernia is present.   Neurological:      General: No focal deficit present.      Mental Status: She is alert and oriented to person, place, and time.   Psychiatric:         Mood and Affect: Mood normal.         Behavior: Behavior normal.     They will needed especially coming up shortly here 1-3 providers schedules    Procedures    Point of Care Test & Imaging Results from this visit  Results for orders placed or performed in  visit on 10/22/24   POCT UA Automated manually resulted   Result Value Ref Range    POC Color, Urine Yellow Straw, Yellow, Light-Yellow    POC Appearance, Urine Clear Clear    POC Glucose, Urine NEGATIVE NEGATIVE mg/dl    POC Bilirubin, Urine NEGATIVE NEGATIVE    POC Ketones, Urine NEGATIVE NEGATIVE mg/dl    POC Specific Gravity, Urine 1.010 1.005 - 1.035    POC Blood, Urine NEGATIVE NEGATIVE    POC PH, Urine 6.5 No Reference Range Established PH    POC Protein, Urine NEGATIVE NEGATIVE, 30 (1+) mg/dl    POC Urobilinogen, Urine 0.2 0.2, 1.0 EU/DL    Poc Nitrite, Urine NEGATIVE NEGATIVE    POC Leukocytes, Urine NEGATIVE NEGATIVE   POCT pregnancy, urine manually resulted   Result Value Ref Range    Preg Test, Ur Negative Negative      No results found.    Diagnostic study results (if any) were reviewed by Mahad Nick PA-C.    Assessment/Plan   Allergies, medications, history, and pertinent labs/EKGs/Imaging reviewed by Mahad Nick PA-C.     Medical Decision Making  I did discuss with patient urinalysis not indicative of urinary tract infection as no blood or leukocytes in urine no CVA tenderness and patient is well-appearing at this time I do not suspect kidney stone.  Due to patient history of ovarian cyst I did advise patient to follow-up with primary or OB/GYN for possible imaging such as CT scan or ultrasound study 7 days.  Patient was informed that if worsening symptoms such as fevers chills vomiting or diarrhea or increase in abdominal pain patient is to go to the emergency room.  As patient did complain of left lower quadrant pain I will place patient on Bactrim to cover for etiology such as diverticulitis.  Patient verbalized understanding and is agreeable to plan discharge from urgent care A+O x 4 stable condition no signs of distress    Orders and Diagnoses  Diagnoses and all orders for this visit:  Lower abdominal pain  -     POCT UA Automated manually resulted  -     POCT pregnancy, urine  manually resulted      Medical Admin Record      Patient disposition: Home    Electronically signed by Mahad Nick PA-C  11:16 AM

## 2024-10-22 NOTE — TELEPHONE ENCOUNTER
Patient called needing an appointment for possible ovarian cyst, she had an appointment with urgent care today 10/22/24. I didn't see an opening and didn't want to keep pushing her out further. Please advise.

## 2024-10-24 ENCOUNTER — OFFICE VISIT (OUTPATIENT)
Dept: OBSTETRICS AND GYNECOLOGY | Facility: CLINIC | Age: 44
End: 2024-10-24
Payer: COMMERCIAL

## 2024-10-24 VITALS
BODY MASS INDEX: 38.55 KG/M2 | HEIGHT: 68 IN | SYSTOLIC BLOOD PRESSURE: 142 MMHG | DIASTOLIC BLOOD PRESSURE: 88 MMHG | WEIGHT: 254.4 LBS

## 2024-10-24 DIAGNOSIS — R10.2 PELVIC PAIN IN FEMALE: Primary | ICD-10-CM

## 2024-10-24 PROCEDURE — 99213 OFFICE O/P EST LOW 20 MIN: CPT | Performed by: OBSTETRICS & GYNECOLOGY

## 2024-10-24 PROCEDURE — 3008F BODY MASS INDEX DOCD: CPT | Performed by: OBSTETRICS & GYNECOLOGY

## 2024-10-24 NOTE — PROGRESS NOTES
Subjective   Patient ID: Delicia Vazquez is a 44 y.o. female who presents for Ovarian Cyst. Patient was seen in urgent care on 10/22/24.  HPI  Had abdominal pain x 1 week   Bloating, No vtg , no  fever or diarrhea  Had a 4 cm rt ovarian cyst in July,    Menses light reg LMP 10/10/24      Review of Systems  Neg was given Rx Bactrim and Flagyl fr urgent care   Objective   Physical Exam  Physical Exam  Constitutional:       Appearance: Normal appearance.     Abdominal:    Abdomen is flat. Soft with no masses, non tender       Genitourinary:     Labia:  No lesions  and No rash.       Urethra: No urethral lesion.      Bladder: no prolapse     Vagina: Normal mucosa, pink and no discharge.     Cervix: Normal. Small w no lesions      Uterus: not palpable w BMI , non tender .      Adnexa:  Bilaterally with no mass or tenderness.                  Assessment/Plan   Diagnoses and all orders for this visit:  Pelvic pain in female  -     US pelvis transvaginal; Future     MD Ivana Harris, [facility] 10/24/24 9:51 AM

## 2024-10-26 ENCOUNTER — HOSPITAL ENCOUNTER (OUTPATIENT)
Dept: RADIOLOGY | Facility: HOSPITAL | Age: 44
Discharge: HOME | End: 2024-10-26
Payer: COMMERCIAL

## 2024-10-26 DIAGNOSIS — R10.2 PELVIC PAIN IN FEMALE: ICD-10-CM

## 2024-10-26 PROCEDURE — 76830 TRANSVAGINAL US NON-OB: CPT

## 2024-11-04 PROBLEM — N83.201 BILATERAL OVARIAN CYSTS: Status: ACTIVE | Noted: 2024-11-04

## 2024-11-04 PROBLEM — N83.202 BILATERAL OVARIAN CYSTS: Status: ACTIVE | Noted: 2024-11-04

## 2025-03-07 ENCOUNTER — PATIENT MESSAGE (OUTPATIENT)
Dept: PRIMARY CARE | Facility: CLINIC | Age: 45
End: 2025-03-07
Payer: COMMERCIAL

## 2025-03-07 DIAGNOSIS — K21.9 GASTROESOPHAGEAL REFLUX DISEASE WITHOUT ESOPHAGITIS: ICD-10-CM

## 2025-03-07 RX ORDER — PANTOPRAZOLE SODIUM 20 MG/1
20 TABLET, DELAYED RELEASE ORAL DAILY
Qty: 90 TABLET | Refills: 0 | Status: SHIPPED | OUTPATIENT
Start: 2025-03-07

## 2025-04-21 ASSESSMENT — ENCOUNTER SYMPTOMS
RHINORRHEA: 0
DIARRHEA: 0
ABDOMINAL PAIN: 0
COUGH: 0
HEADACHES: 1
NECK PAIN: 0
SORE THROAT: 0
VOMITING: 0

## 2025-04-22 ENCOUNTER — OFFICE VISIT (OUTPATIENT)
Dept: OTOLARYNGOLOGY | Facility: CLINIC | Age: 45
End: 2025-04-22
Payer: COMMERCIAL

## 2025-04-22 DIAGNOSIS — K11.8 PAIN OF SUBMANDIBULAR GLAND: ICD-10-CM

## 2025-04-22 DIAGNOSIS — K11.20 SIALOADENITIS: Primary | ICD-10-CM

## 2025-04-22 DIAGNOSIS — H92.02 LEFT EAR PAIN: ICD-10-CM

## 2025-04-22 PROCEDURE — 1036F TOBACCO NON-USER: CPT | Performed by: PHYSICIAN ASSISTANT

## 2025-04-22 PROCEDURE — 99203 OFFICE O/P NEW LOW 30 MIN: CPT | Performed by: PHYSICIAN ASSISTANT

## 2025-04-22 RX ORDER — PREDNISONE 20 MG/1
TABLET ORAL
Qty: 9 TABLET | Refills: 0 | Status: SHIPPED | OUTPATIENT
Start: 2025-04-22

## 2025-04-22 ASSESSMENT — ENCOUNTER SYMPTOMS
VOMITING: 0
COUGH: 0
SORE THROAT: 0
ABDOMINAL PAIN: 0
HEADACHES: 1
DIARRHEA: 0
NECK PAIN: 0
RHINORRHEA: 0

## 2025-04-22 NOTE — PROGRESS NOTES
Delicia Vazquez is a 44 y.o. year old female patient with Earache (Left ear)     Patient presents to the office today for assessment of left ear and neck pain.  She states that the pain feels deep in the left ear/neck and is radiating to the left submandibular region and causing some left occipital headache symptoms.  She reports symptoms to have been present over the last 1 to 2 weeks.  She states that pain is fairly constant and varying in intensity.  She denies any alleviating factors.  She denies pain with eating.  She states that the pain is typically worse first thing in the morning or at the end of the day.  She denies any changes in hearing.  She is without concerns for dizziness or tinnitus.  She denies smoking history.  All other ENT issues are negative.      Review of Systems   HENT:  Positive for ear pain. Negative for ear discharge, hearing loss, rhinorrhea and sore throat.    Respiratory:  Negative for cough.    Gastrointestinal:  Negative for abdominal pain, diarrhea and vomiting.   Musculoskeletal:  Negative for neck pain.   Skin:  Negative for rash.   Neurological:  Positive for headaches.   All other systems reviewed and are negative.        Physical Exam:   General appearance: No acute distress. Normal facies. Symmetric facial movement. No gross lesions of the face are noted.  The external ear structures appear normal. The ear canals patent and the tympanic membranes are intact without evidence of air-fluid levels, retraction, or congenital defects.  Anterior rhinoscopy notes essentially a midline nasal septum. Examination is noted for normal healthy mucosal membranes without any evidence of lesions, polyps, or exudate. The tongue is normally mobile. There are no lesions on the gingiva, buccal, or oral mucosa. There are no oral cavity masses.  The neck is negative for mass lymphadenopathy. The trachea and parotid are clear. The thyroid bed is grossly unremarkable.  There is tenderness to palpation  over the left submandibular region and over the gland itself without asymmetry in size right versus left.  There is no purulent drainage expressed through the sublingual duct.    Assessment/Plan   1.  Left submandibular pain/sialadenitis??  2.  Otalgia      Patient seen in the office today for assessment of left ear and neck pain.  Patient with unremarkable ear examination.  There is tenderness to palpation in the left submandibular region.  Patient does not have any purulent drainage expressed from the sublingual duct.  At this time patient recommended for adequate hydration heat gentle massage and we will start the patient on oral prednisone taper.  She  will follow-up in 1 month or sooner should a problem arise.     All questions and concerns were answered and addressed. The patient expresses understanding and will follow up as advised.  Thank you again for allowing us to participate in the care of this patient.        Answers submitted by the patient for this visit:  Ear Pain Questionnaire (Submitted on 4/21/2025)  Chief Complaint: Ear pain  Affected ear: left  Chronicity: new  Onset: 1 to 4 weeks ago  Progression since onset: gradually worsening  Frequency: constantly  Fever: no fever  Fever duration: less than 1 day  Pain - numeric: 7/10

## 2025-05-04 ASSESSMENT — ENCOUNTER SYMPTOMS
WEIGHT LOSS: 0
CONSTIPATION: 0
HEADACHES: 1
HEMATURIA: 0
FREQUENCY: 0
ARTHRALGIAS: 1
ABDOMINAL PAIN: 1
ANOREXIA: 0
DIARRHEA: 0
BELCHING: 0
FLATUS: 0
FEVER: 0
VOMITING: 0
MYALGIAS: 0
NAUSEA: 0
DYSURIA: 0
HEMATOCHEZIA: 0

## 2025-05-06 ENCOUNTER — APPOINTMENT (OUTPATIENT)
Dept: PRIMARY CARE | Facility: CLINIC | Age: 45
End: 2025-05-06
Payer: COMMERCIAL

## 2025-05-06 VITALS
DIASTOLIC BLOOD PRESSURE: 100 MMHG | HEART RATE: 100 BPM | HEIGHT: 68 IN | BODY MASS INDEX: 43.95 KG/M2 | OXYGEN SATURATION: 98 % | WEIGHT: 290 LBS | SYSTOLIC BLOOD PRESSURE: 160 MMHG

## 2025-05-06 DIAGNOSIS — Z13.220 LIPID SCREENING: ICD-10-CM

## 2025-05-06 DIAGNOSIS — Z13.29 THYROID DISORDER SCREENING: ICD-10-CM

## 2025-05-06 DIAGNOSIS — R63.5 WEIGHT GAIN: ICD-10-CM

## 2025-05-06 DIAGNOSIS — K21.9 GASTROESOPHAGEAL REFLUX DISEASE WITHOUT ESOPHAGITIS: ICD-10-CM

## 2025-05-06 DIAGNOSIS — N83.201 BILATERAL OVARIAN CYSTS: Primary | ICD-10-CM

## 2025-05-06 DIAGNOSIS — N92.6 ABNORMAL MENSES: ICD-10-CM

## 2025-05-06 DIAGNOSIS — I10 PRIMARY HYPERTENSION: ICD-10-CM

## 2025-05-06 DIAGNOSIS — N83.202 BILATERAL OVARIAN CYSTS: Primary | ICD-10-CM

## 2025-05-06 DIAGNOSIS — R73.9 HYPERGLYCEMIA: ICD-10-CM

## 2025-05-06 DIAGNOSIS — Z12.31 BREAST CANCER SCREENING BY MAMMOGRAM: ICD-10-CM

## 2025-05-06 PROCEDURE — 3077F SYST BP >= 140 MM HG: CPT | Performed by: STUDENT IN AN ORGANIZED HEALTH CARE EDUCATION/TRAINING PROGRAM

## 2025-05-06 PROCEDURE — 3080F DIAST BP >= 90 MM HG: CPT | Performed by: STUDENT IN AN ORGANIZED HEALTH CARE EDUCATION/TRAINING PROGRAM

## 2025-05-06 PROCEDURE — 3008F BODY MASS INDEX DOCD: CPT | Performed by: STUDENT IN AN ORGANIZED HEALTH CARE EDUCATION/TRAINING PROGRAM

## 2025-05-06 PROCEDURE — 99214 OFFICE O/P EST MOD 30 MIN: CPT | Performed by: STUDENT IN AN ORGANIZED HEALTH CARE EDUCATION/TRAINING PROGRAM

## 2025-05-06 PROCEDURE — 1036F TOBACCO NON-USER: CPT | Performed by: STUDENT IN AN ORGANIZED HEALTH CARE EDUCATION/TRAINING PROGRAM

## 2025-05-06 RX ORDER — PANTOPRAZOLE SODIUM 20 MG/1
20 TABLET, DELAYED RELEASE ORAL DAILY
Qty: 90 TABLET | Refills: 1 | Status: SHIPPED | OUTPATIENT
Start: 2025-05-06

## 2025-05-06 RX ORDER — LOSARTAN POTASSIUM 50 MG/1
50 TABLET ORAL DAILY
Qty: 100 TABLET | Refills: 3 | Status: SHIPPED | OUTPATIENT
Start: 2025-05-06 | End: 2026-06-10

## 2025-05-06 NOTE — PROGRESS NOTES
Answers submitted by the patient for this visit:  Abdominal Pain Questionnaire (Submitted on 5/4/2025)  Chief Complaint: Abdominal pain  Chronicity: recurrent  Onset: more than 1 month ago  Onset quality: sudden  Frequency: constantly  Episode duration: 1 Months  Progression since onset: gradually worsening  Pain location: generalized abdominal region  Pain - numeric: 8/10  Pain quality: cramping  Radiates to: chest, back, pelvis  anorexia: No  arthralgias: Yes  belching: No  constipation: No  diarrhea: No  dysuria: No  fever: No  flatus: No  frequency: No  headaches: Yes  hematochezia: No  hematuria: No  melena: No  myalgias: No  nausea: No  weight loss: No  vomiting: No  Aggravated by: nothing  Relieved by: nothing  Subjective   Patient ID: Delicia Vazquez is a 44 y.o. female who presents for Follow-up (Has overall not been feeling well. She feels like her glands in the neck are swollen. She saw ENT 2 weeks ago and they gave her a short course of prednisone to decrease swelling in the glands. /Is fasting for labs and is also requesting to check her hormone levels due to her having ovarian cysts and the swelling in her neck. She is not sure what to check but is asking if this is something worth checking . /BP is high- wonders if she needs meds/Due for mammogram).  History of Present Illness  Delicia Vazquez is a 44 year old female who presents with ongoing ovarian cyst issues and recent weight gain.    She has been experiencing ovarian cysts since at least August or September of the previous year, causing severe pain that led her to fall to the floor. She visited the ER due to the pain. Monthly ultrasounds have been conducted, and an endometrial biopsy was performed over the summer. The cysts have not significantly reduced in size, with a 3x3x4 cm cyst on the left ovary. The right ovary was not visualized in the most recent ultrasound but previously had a similar-sized cyst. She has not had a menstrual period since  "November, which she attributes to the arm implant received in December or January. She does not have PCOS, and the arm implant has not resolved her symptoms.    She reports significant stress related to work issues that began in January, leading to emotional distress and weight gain. She has regained 75 pounds that she had previously lost. She experiences poor sleep quality, waking every hour or two, and engages in emotional eating. She is on Wellbutrin and Buspar for mood management but feels her mood has not been great, possibly due to her physical health issues.    In February and March, she experienced a severe illness without taking antibiotics. Lingering symptoms include glandular aches and discomfort in the neck, jaw, and teeth. An ENT noted inflamed glands and prescribed a 10-day course of steroids, which improved her energy levels but did not fully resolve her symptoms.    She has a history of high blood pressure, which she has not previously treated with medication. She acknowledges a history of elevated blood pressure during appointments and is open to monitoring it at home.    Her mother was diagnosed with lung cancer and underwent surgery to remove a third of her lung, which has been a significant stressor for her.    Review of Systems  ROS otherwise negative aside from what was mentioned above in HPI.    Objective     BP (!) 160/100 (BP Location: Left arm, Patient Position: Sitting, BP Cuff Size: Large adult)   Pulse 100   Ht 1.727 m (5' 8\")   Wt 132 kg (290 lb)   SpO2 98%   BMI 44.09 kg/m²      Current Outpatient Medications   Medication Instructions    acyclovir (ZOVIRAX) 200 mg, oral, Daily RT    buPROPion XL (WELLBUTRIN XL) 150 mg, Daily    busPIRone (Buspar) 10 mg tablet TAKE 1 TABLET BY MOUTH 3 TIMES A DAY AS DIRECTED    cetirizine (ZyrTEC) 10 mg capsule 1 capsule, Daily    desvenlafaxine (PRISTIQ) 50 mg, Daily    losartan (COZAAR) 50 mg, oral, Daily    pantoprazole (PROTONIX) 20 mg, oral, " Daily, Do not crush, chew, or split.    predniSONE (Deltasone) 20 mg tablet 2 tabs daily x3 days then 1 tablet daily for 2 days then 1/2 tablet for 2 days       Physical Exam  VITALS: P- 100  GENERAL: Alert, cooperative, well developed, no acute distress.  HEENT: Normocephalic, normal oropharynx, moist mucous membranes.  NECK: Thyroid normal to palpation.  CHEST: Clear to auscultation bilaterally, no wheezes, rhonchi, or crackles.  CV: Normal heart rate and rhythm, S1 and S2 normal without murmurs.  ABDOMEN: Soft, non-tender, non-distended.  SKIN: No rashes or lesions.  NEURO: Cranial nerves grossly intact, moves all extremities without gross motor impairment, normal ROM.  EXTREMITIES: No edema.    Results  RADIOLOGY  Pelvic ultrasound (05/05/2025): Left ovarian cyst 3x3x4 cm, right ovary not visualized.  Pelvic ultrasound (02/2025): Right ovarian cyst 3x3x4 cm.  Pelvic ultrasound (11/2024): Fibroid 1x1 cm in uterus.    Assessment & Plan  Hypertension  Newly diagnosed hypertension with elevated heart rate, likely exacerbated by stress and anxiety. No prior antihypertensive medication use. Elevated heart rate suggests an anxiety component. Losartan chosen for its renal protective properties and low side effect profile. Monitoring of potassium levels is necessary due to potential retention.  - Start losartan once daily for blood pressure management.  - Monitor blood pressure at home every couple of days.  - Check potassium levels due to losartan use.  - Follow up in 6 weeks to assess blood pressure control.    Obesity, BMI 39.0-39.9  Significant weight gain with emotional eating and stress likely contributing. Previous successful weight loss of 75 pounds indicates potential for future weight management. Discussed the possibility of weight loss impacting blood pressure management.    Ovarian cysts  Persistent ovarian cysts with significant discomfort. History of a large cyst requiring consideration for surgery. Current  cysts on both ovaries, with recent ultrasound showing a 3x3x4 cm cyst on the left ovary. Right ovary not visualized in recent ultrasound. Discussed checking testosterone levels as it may influence management, especially if PCOS is suspected.  - Check testosterone levels to assess for potential PCOS.  - Coordinate with gynecologist for further management of ovarian cysts.    Uterine fibroid  Presence of a 1x1 cm fibroid in the uterus, causing bloating and discomfort. Fibroid has been present since at least fall of the previous year.    Gastroesophageal reflux disease (GERD)  GERD managed with pantoprazole. Recent refill provided for 90 days with a refill available.  - Continue pantoprazole as prescribed.    Thyroid disorder screening  Thyroid function to be assessed as part of a broad lab workup due to symptoms of fatigue and weight gain.  - Order thyroid function tests.    General Health Maintenance  Overdue for routine health maintenance screenings.  - Order mammogram, to be scheduled by her.    Follow-up  Follow-up needed to assess response to new treatments and ongoing symptoms.  - Schedule follow-up appointment in 6 weeks.  - She will message with any changes or concerns before the next appointment.    Follow up in 6 weeks    Natalia Balderas, DO     This medical note was created with the assistance of artificial intelligence (AI) for documentation purposes. The content has been reviewed and confirmed by the healthcare provider for accuracy and completeness. Patient consented to the use of audio recording and use of AI during their visit.

## 2025-05-07 LAB
ALBUMIN SERPL-MCNC: 4.3 G/DL (ref 3.6–5.1)
ALP SERPL-CCNC: 52 U/L (ref 31–125)
ALT SERPL-CCNC: 49 U/L (ref 6–29)
ANION GAP SERPL CALCULATED.4IONS-SCNC: 10 MMOL/L (CALC) (ref 7–17)
AST SERPL-CCNC: 35 U/L (ref 10–35)
BILIRUB SERPL-MCNC: 0.4 MG/DL (ref 0.2–1.2)
BUN SERPL-MCNC: 9 MG/DL (ref 7–25)
CALCIUM SERPL-MCNC: 8.9 MG/DL (ref 8.6–10.2)
CHLORIDE SERPL-SCNC: 102 MMOL/L (ref 98–110)
CHOLEST SERPL-MCNC: 201 MG/DL
CHOLEST/HDLC SERPL: 4.1 (CALC)
CO2 SERPL-SCNC: 25 MMOL/L (ref 20–32)
CREAT SERPL-MCNC: 0.55 MG/DL (ref 0.5–0.99)
EGFRCR SERPLBLD CKD-EPI 2021: 115 ML/MIN/1.73M2
ERYTHROCYTE [DISTWIDTH] IN BLOOD BY AUTOMATED COUNT: 16.4 % (ref 11–15)
EST. AVERAGE GLUCOSE BLD GHB EST-MCNC: 140 MG/DL
EST. AVERAGE GLUCOSE BLD GHB EST-SCNC: 7.7 MMOL/L
GLUCOSE SERPL-MCNC: 112 MG/DL (ref 65–99)
HBA1C MFR BLD: 6.5 %
HCT VFR BLD AUTO: 37.1 % (ref 35–45)
HDLC SERPL-MCNC: 49 MG/DL
HGB BLD-MCNC: 10.9 G/DL (ref 11.7–15.5)
LDLC SERPL CALC-MCNC: 125 MG/DL (CALC)
MCH RBC QN AUTO: 23.5 PG (ref 27–33)
MCHC RBC AUTO-ENTMCNC: 29.4 G/DL (ref 32–36)
MCV RBC AUTO: 80 FL (ref 80–100)
NONHDLC SERPL-MCNC: 152 MG/DL (CALC)
PLATELET # BLD AUTO: 271 THOUSAND/UL (ref 140–400)
PMV BLD REES-ECKER: 9.1 FL (ref 7.5–12.5)
POTASSIUM SERPL-SCNC: 4.3 MMOL/L (ref 3.5–5.3)
PROT SERPL-MCNC: 6.8 G/DL (ref 6.1–8.1)
RBC # BLD AUTO: 4.64 MILLION/UL (ref 3.8–5.1)
SODIUM SERPL-SCNC: 137 MMOL/L (ref 135–146)
TESTOST FREE SERPL-MCNC: NORMAL PG/ML
TESTOST SERPL-MCNC: NORMAL NG/DL
TRIGL SERPL-MCNC: 154 MG/DL
TSH SERPL-ACNC: 1.78 MIU/L
WBC # BLD AUTO: 7.8 THOUSAND/UL (ref 3.8–10.8)

## 2025-05-13 LAB
ALBUMIN SERPL-MCNC: 4.3 G/DL (ref 3.6–5.1)
ALP SERPL-CCNC: 52 U/L (ref 31–125)
ALT SERPL-CCNC: 49 U/L (ref 6–29)
ANION GAP SERPL CALCULATED.4IONS-SCNC: 10 MMOL/L (CALC) (ref 7–17)
AST SERPL-CCNC: 35 U/L (ref 10–35)
BILIRUB SERPL-MCNC: 0.4 MG/DL (ref 0.2–1.2)
BUN SERPL-MCNC: 9 MG/DL (ref 7–25)
CALCIUM SERPL-MCNC: 8.9 MG/DL (ref 8.6–10.2)
CHLORIDE SERPL-SCNC: 102 MMOL/L (ref 98–110)
CHOLEST SERPL-MCNC: 201 MG/DL
CHOLEST/HDLC SERPL: 4.1 (CALC)
CO2 SERPL-SCNC: 25 MMOL/L (ref 20–32)
CREAT SERPL-MCNC: 0.55 MG/DL (ref 0.5–0.99)
EGFRCR SERPLBLD CKD-EPI 2021: 115 ML/MIN/1.73M2
ERYTHROCYTE [DISTWIDTH] IN BLOOD BY AUTOMATED COUNT: 16.4 % (ref 11–15)
EST. AVERAGE GLUCOSE BLD GHB EST-MCNC: 140 MG/DL
EST. AVERAGE GLUCOSE BLD GHB EST-SCNC: 7.7 MMOL/L
GLUCOSE SERPL-MCNC: 112 MG/DL (ref 65–99)
HBA1C MFR BLD: 6.5 %
HCT VFR BLD AUTO: 37.1 % (ref 35–45)
HDLC SERPL-MCNC: 49 MG/DL
HGB BLD-MCNC: 10.9 G/DL (ref 11.7–15.5)
LDLC SERPL CALC-MCNC: 125 MG/DL (CALC)
MCH RBC QN AUTO: 23.5 PG (ref 27–33)
MCHC RBC AUTO-ENTMCNC: 29.4 G/DL (ref 32–36)
MCV RBC AUTO: 80 FL (ref 80–100)
NONHDLC SERPL-MCNC: 152 MG/DL (CALC)
PLATELET # BLD AUTO: 271 THOUSAND/UL (ref 140–400)
PMV BLD REES-ECKER: 9.1 FL (ref 7.5–12.5)
POTASSIUM SERPL-SCNC: 4.3 MMOL/L (ref 3.5–5.3)
PROT SERPL-MCNC: 6.8 G/DL (ref 6.1–8.1)
RBC # BLD AUTO: 4.64 MILLION/UL (ref 3.8–5.1)
SODIUM SERPL-SCNC: 137 MMOL/L (ref 135–146)
TESTOST FREE SERPL-MCNC: 2.6 PG/ML (ref 0.1–6.4)
TESTOST SERPL-MCNC: 31 NG/DL (ref 2–45)
TRIGL SERPL-MCNC: 154 MG/DL
TSH SERPL-ACNC: 1.78 MIU/L
WBC # BLD AUTO: 7.8 THOUSAND/UL (ref 3.8–10.8)

## 2025-05-20 ENCOUNTER — APPOINTMENT (OUTPATIENT)
Dept: OTOLARYNGOLOGY | Facility: CLINIC | Age: 45
End: 2025-05-20
Payer: COMMERCIAL

## 2025-05-25 DIAGNOSIS — Z86.19 H/O COLD SORES: ICD-10-CM

## 2025-05-29 RX ORDER — ACYCLOVIR 200 MG/1
200 CAPSULE ORAL DAILY
Qty: 90 CAPSULE | Refills: 1 | Status: SHIPPED | OUTPATIENT
Start: 2025-05-29

## 2025-06-04 ENCOUNTER — APPOINTMENT (OUTPATIENT)
Dept: OBSTETRICS AND GYNECOLOGY | Facility: CLINIC | Age: 45
End: 2025-06-04
Payer: COMMERCIAL

## 2025-06-11 ASSESSMENT — PROMIS GLOBAL HEALTH SCALE
RATE_QUALITY_OF_LIFE: GOOD
CARRYOUT_SOCIAL_ACTIVITIES: VERY GOOD
EMOTIONAL_PROBLEMS: RARELY
CARRYOUT_PHYSICAL_ACTIVITIES: COMPLETELY
RATE_GENERAL_HEALTH: GOOD
RATE_AVERAGE_PAIN: 0
RATE_MENTAL_HEALTH: GOOD
RATE_PHYSICAL_HEALTH: GOOD
RATE_SOCIAL_SATISFACTION: GOOD

## 2025-06-17 ENCOUNTER — APPOINTMENT (OUTPATIENT)
Dept: PRIMARY CARE | Facility: CLINIC | Age: 45
End: 2025-06-17
Payer: COMMERCIAL

## 2025-06-17 VITALS
WEIGHT: 283 LBS | HEIGHT: 68 IN | BODY MASS INDEX: 42.89 KG/M2 | OXYGEN SATURATION: 96 % | SYSTOLIC BLOOD PRESSURE: 140 MMHG | DIASTOLIC BLOOD PRESSURE: 80 MMHG | HEART RATE: 91 BPM

## 2025-06-17 DIAGNOSIS — N83.201 BILATERAL OVARIAN CYSTS: ICD-10-CM

## 2025-06-17 DIAGNOSIS — K76.0 HEPATIC STEATOSIS: ICD-10-CM

## 2025-06-17 DIAGNOSIS — E11.9 TYPE 2 DIABETES MELLITUS WITHOUT COMPLICATION, WITHOUT LONG-TERM CURRENT USE OF INSULIN: ICD-10-CM

## 2025-06-17 DIAGNOSIS — R63.5 WEIGHT GAIN: ICD-10-CM

## 2025-06-17 DIAGNOSIS — Z00.00 ANNUAL PHYSICAL EXAM: Primary | ICD-10-CM

## 2025-06-17 DIAGNOSIS — I10 PRIMARY HYPERTENSION: ICD-10-CM

## 2025-06-17 DIAGNOSIS — N83.202 BILATERAL OVARIAN CYSTS: ICD-10-CM

## 2025-06-17 DIAGNOSIS — K21.9 GASTROESOPHAGEAL REFLUX DISEASE WITHOUT ESOPHAGITIS: ICD-10-CM

## 2025-06-17 PROCEDURE — 4010F ACE/ARB THERAPY RXD/TAKEN: CPT | Performed by: STUDENT IN AN ORGANIZED HEALTH CARE EDUCATION/TRAINING PROGRAM

## 2025-06-17 PROCEDURE — 3077F SYST BP >= 140 MM HG: CPT | Performed by: STUDENT IN AN ORGANIZED HEALTH CARE EDUCATION/TRAINING PROGRAM

## 2025-06-17 PROCEDURE — 3008F BODY MASS INDEX DOCD: CPT | Performed by: STUDENT IN AN ORGANIZED HEALTH CARE EDUCATION/TRAINING PROGRAM

## 2025-06-17 PROCEDURE — 99396 PREV VISIT EST AGE 40-64: CPT | Performed by: STUDENT IN AN ORGANIZED HEALTH CARE EDUCATION/TRAINING PROGRAM

## 2025-06-17 PROCEDURE — 1036F TOBACCO NON-USER: CPT | Performed by: STUDENT IN AN ORGANIZED HEALTH CARE EDUCATION/TRAINING PROGRAM

## 2025-06-17 PROCEDURE — 3079F DIAST BP 80-89 MM HG: CPT | Performed by: STUDENT IN AN ORGANIZED HEALTH CARE EDUCATION/TRAINING PROGRAM

## 2025-06-17 ASSESSMENT — PATIENT HEALTH QUESTIONNAIRE - PHQ9
2. FEELING DOWN, DEPRESSED OR HOPELESS: NOT AT ALL
SUM OF ALL RESPONSES TO PHQ9 QUESTIONS 1 AND 2: 0
1. LITTLE INTEREST OR PLEASURE IN DOING THINGS: NOT AT ALL

## 2025-06-17 NOTE — PROGRESS NOTES
Subjective   Patient ID: Delicia Vazquez is a 45 y.o. female who presents for Annual Exam (Physical / following up on blood pressure/Feeling better,less tired ).  History of Present Illness  The patient presents for evaluation of blood pressure, diabetes, and allergies.    She reports a significant improvement in her overall health status compared to 5 weeks ago. She attributes her previous discomfort to elevated blood pressure, which led to increased food intake and subsequent weight gain. However, she has since lost 7 pounds over the past few weeks. She has been monitoring her blood pressure at home, which has consistently remained around 130. She is currently on losartan and reports no adverse effects from this medication.    She expresses concern about her recent diagnosis of diabetes, with an A1c level of 6.5. She is considering the use of over-the-counter testing kits for home monitoring. She also mentions occasional episodes of heart palpitations in the evenings, typically when she is relaxing or lying on the couch.    She has been experiencing mild ear discomfort and a sore throat for the past 2 days, which she believes may be due to allergies. She does not wish to receive any treatment for these symptoms at this time. She also reports a raw sensation in her nose and uses over-the-counter Zyrtec but does not use any nasal sprays.    She reports no changes in her urinary habits, although she notes that she tends to urinate frequently but in small amounts. She believes this may be more of a habit than a necessity.    She has been in her new work position for almost 3 months, which has significantly reduced her stress levels. She feels better physically and is more capable of handling life in general. She had her labs sent to her psychiatric nurse practitioner to ensure comprehensive care.    She reports an improvement in her ovarian condition.    FAMILY HISTORY  She does not have any family history of colon  "cancer.    Review of Systems  ROS otherwise negative aside from what was mentioned above in HPI.    Objective     /80 (BP Location: Right arm, Patient Position: Sitting, BP Cuff Size: Large adult)   Pulse 91   Ht 1.727 m (5' 8\")   Wt 128 kg (283 lb)   SpO2 96%   BMI 43.03 kg/m²      Current Outpatient Medications   Medication Instructions    acyclovir (ZOVIRAX) 200 mg, oral, Daily    buPROPion XL (WELLBUTRIN XL) 150 mg, Daily    busPIRone (Buspar) 10 mg tablet TAKE 1 TABLET BY MOUTH 3 TIMES A DAY AS DIRECTED    cetirizine (ZyrTEC) 10 mg capsule 1 capsule, Daily    desvenlafaxine (PRISTIQ) 50 mg, Daily    losartan (COZAAR) 50 mg, oral, Daily    pantoprazole (PROTONIX) 20 mg, oral, Daily, Do not crush, chew, or split.       Physical Exam  Vitals reviewed.   Constitutional:       General: She is not in acute distress.  HENT:      Head: Normocephalic.      Right Ear: External ear normal.      Left Ear: External ear normal.      Nose: Nose normal.   Eyes:      Extraocular Movements: Extraocular movements intact.      Pupils: Pupils are equal, round, and reactive to light.   Cardiovascular:      Rate and Rhythm: Normal rate and regular rhythm.      Heart sounds: Normal heart sounds.   Pulmonary:      Effort: Pulmonary effort is normal. No respiratory distress.      Breath sounds: Normal breath sounds.   Abdominal:      Palpations: Abdomen is soft.      Tenderness: There is no abdominal tenderness. There is no guarding.   Musculoskeletal:         General: Normal range of motion.      Cervical back: Normal range of motion and neck supple.   Skin:     General: Skin is warm and dry.   Neurological:      Mental Status: She is alert. Mental status is at baseline.   Psychiatric:         Mood and Affect: Mood normal.         Behavior: Behavior normal.           Results  Labs   - A1c: 6.5    Assessment & Plan  1. Hypertension.  - Blood pressure readings at home are around 130.  - Increased pain and limited mobility.  - " Losartan is effective with no reported side effects.  - Current dose of losartan will be maintained for its renal protection benefits.    2. Diabetes Mellitus.  - A1c level is at the threshold for diabetes diagnosis (6.5).  - Weight loss of 7 pounds in the last 5 weeks, contributing to improved blood sugar levels.  - Continued improvement in diet and weight loss efforts advised.  - Recheck of A1c and liver function tests in 2 months; follow-up in 6 months if condition improves, earlier if necessary.    3. Allergic Rhinitis.  - Reports of sore throat and ear discomfort, likely due to allergies.  - No signs of infection observed.  - Flonase nasal spray recommended to reduce nasal swelling and pressure.  - Continue using over-the-counter Zyrtec for antihistamine effects.    4. Health Maintenance.  - Advised to undergo colon cancer screening, either through colonoscopy or Cologuard.  - Mammogram order placed during last visit; check with insurance regarding coverage.  - Frequent urination reported; advised to try holding off urination when possible to reduce frequency.    5. Stress Management.  - New work position for almost 3 months has significantly reduced stress levels.  - Improved physical health contributing to better stress management.  - Labs sent to psychiatric nurse practitioner for review of any contributing factors.        Natalia Balderas, DO     This medical note was created with the assistance of artificial intelligence (AI) for documentation purposes. The content has been reviewed and confirmed by the healthcare provider for accuracy and completeness. Patient consented to the use of audio recording and use of AI during their visit.

## 2025-06-18 LAB
ESTRADIOL SERPL-MCNC: 253 PG/ML
FSH SERPL-ACNC: 4.4 MIU/ML
LH SERPL-ACNC: 4.2 MIU/ML

## 2025-06-30 DIAGNOSIS — R10.9 ABDOMINAL DISCOMFORT: ICD-10-CM

## 2025-06-30 DIAGNOSIS — N83.201 BILATERAL OVARIAN CYSTS: Primary | ICD-10-CM

## 2025-06-30 DIAGNOSIS — N83.202 BILATERAL OVARIAN CYSTS: Primary | ICD-10-CM

## 2025-07-02 ENCOUNTER — OFFICE VISIT (OUTPATIENT)
Dept: PRIMARY CARE | Facility: CLINIC | Age: 45
End: 2025-07-02
Payer: COMMERCIAL

## 2025-07-02 VITALS
SYSTOLIC BLOOD PRESSURE: 140 MMHG | HEART RATE: 110 BPM | BODY MASS INDEX: 42.89 KG/M2 | HEIGHT: 68 IN | OXYGEN SATURATION: 95 % | WEIGHT: 283 LBS | DIASTOLIC BLOOD PRESSURE: 78 MMHG

## 2025-07-02 DIAGNOSIS — U07.1 COVID-19: ICD-10-CM

## 2025-07-02 DIAGNOSIS — J06.9 UPPER RESPIRATORY TRACT INFECTION, UNSPECIFIED TYPE: Primary | ICD-10-CM

## 2025-07-02 PROBLEM — R05.9 COUGH IN ADULT: Status: RESOLVED | Noted: 2025-07-02 | Resolved: 2025-07-02

## 2025-07-02 PROBLEM — E66.813 CLASS 3 OBESITY: Status: ACTIVE | Noted: 2025-07-02

## 2025-07-02 PROBLEM — J30.9 ALLERGIC RHINITIS: Status: ACTIVE | Noted: 2025-07-02

## 2025-07-02 PROBLEM — R09.81 CHRONIC NASAL CONGESTION: Status: ACTIVE | Noted: 2025-07-02

## 2025-07-02 PROBLEM — K21.9 GASTROESOPHAGEAL REFLUX DISEASE: Status: ACTIVE | Noted: 2021-08-26

## 2025-07-02 PROBLEM — R09.81 SINUS CONGESTION: Status: ACTIVE | Noted: 2025-07-02

## 2025-07-02 PROBLEM — F41.1 GENERALIZED ANXIETY DISORDER: Status: ACTIVE | Noted: 2021-08-26

## 2025-07-02 PROBLEM — R92.8 ABNORMAL MAMMOGRAM OF RIGHT BREAST: Status: ACTIVE | Noted: 2025-07-02

## 2025-07-02 PROBLEM — F32.A DEPRESSIVE DISORDER: Status: ACTIVE | Noted: 2021-08-26

## 2025-07-02 PROBLEM — J32.9 CHRONIC SINUSITIS: Status: ACTIVE | Noted: 2021-08-26

## 2025-07-02 PROCEDURE — 99213 OFFICE O/P EST LOW 20 MIN: CPT | Performed by: STUDENT IN AN ORGANIZED HEALTH CARE EDUCATION/TRAINING PROGRAM

## 2025-07-02 PROCEDURE — 3008F BODY MASS INDEX DOCD: CPT | Performed by: STUDENT IN AN ORGANIZED HEALTH CARE EDUCATION/TRAINING PROGRAM

## 2025-07-02 PROCEDURE — 1036F TOBACCO NON-USER: CPT | Performed by: STUDENT IN AN ORGANIZED HEALTH CARE EDUCATION/TRAINING PROGRAM

## 2025-07-02 RX ORDER — NIRMATRELVIR AND RITONAVIR 300-100 MG
3 KIT ORAL 2 TIMES DAILY
Qty: 30 TABLET | Refills: 0 | Status: SHIPPED | OUTPATIENT
Start: 2025-07-02 | End: 2025-07-07

## 2025-07-02 ASSESSMENT — PATIENT HEALTH QUESTIONNAIRE - PHQ9
2. FEELING DOWN, DEPRESSED OR HOPELESS: NOT AT ALL
1. LITTLE INTEREST OR PLEASURE IN DOING THINGS: NOT AT ALL
SUM OF ALL RESPONSES TO PHQ9 QUESTIONS 1 AND 2: 0

## 2025-07-02 NOTE — PROGRESS NOTES
"Subjective   Patient ID: Delicia Vazquez is a 45 y.o. female who presents for Sinus Problem (Monday she came home from work starting to feel sick and felt like she had a fever- is aching, joints feel heavy, sinuses are clogged. She wanted to give it a few days before she called but today she decided she needed to call in. ).  History of Present Illness  The patient presents for evaluation of a viral infection.    She began experiencing symptoms on Monday, 06/30/2025, which she suspects might be due to COVID-19, as they resemble those she had in 02/2025 or 03/2025. She describes a sensation of heaviness and ache in her connective tissues. Her symptoms have been severe enough to disrupt her daily activities, leading her to cancel all her appointments for the day. She has been unable to sleep comfortably for the past 3 days, frequently waking up due to a non-productive cough. She recalls having a fever on Monday but did not measure her temperature. She reports no nausea or vomiting. She has not used Mucinex or any nasal sprays like Flonase or azelastine. Historically, her sinus infections have progressed to bronchitis. Despite trying homeopathic remedies such as zinc and Tylenol, she has found no relief.    Review of Systems  ROS otherwise negative aside from what was mentioned above in HPI.    Objective     /78 (BP Location: Left arm, Patient Position: Sitting, BP Cuff Size: Large adult)   Pulse 110   Ht 1.727 m (5' 8\")   Wt 128 kg (283 lb)   SpO2 95%   BMI 43.03 kg/m²      Current Outpatient Medications   Medication Instructions    acyclovir (ZOVIRAX) 200 mg, oral, Daily    buPROPion XL (WELLBUTRIN XL) 150 mg, Daily    busPIRone (Buspar) 10 mg tablet TAKE 1 TABLET BY MOUTH 3 TIMES A DAY AS DIRECTED    cetirizine (ZyrTEC) 10 mg capsule 1 capsule, Daily    desvenlafaxine (PRISTIQ) 50 mg, Daily    losartan (COZAAR) 50 mg, oral, Daily    nirmatrelvir-ritonavir (Paxlovid) 300 mg (150 mg x 2)-100 mg tablet therapy " pack 3 tablets, oral, 2 times daily, Follow the instructions on the package    pantoprazole (PROTONIX) 20 mg, oral, Daily, Do not crush, chew, or split.       Physical Exam  Nose: Swelling present  Respiratory: Clear to auscultation, no wheezing, rales or rhonchi    Physical Exam  Vitals reviewed.   Constitutional:       General: She is not in acute distress.     Appearance: She is ill-appearing. She is not toxic-appearing.   Eyes:      Pupils: Pupils are equal, round, and reactive to light.   Cardiovascular:      Rate and Rhythm: Normal rate and regular rhythm.   Neurological:      Mental Status: She is alert.   Psychiatric:         Mood and Affect: Mood normal.         Behavior: Behavior normal.       Results  Labs   - COVID-19 test: 07/02/2025, Positive    Assessment & Plan  1. COVID-19.  - Symptoms began two days ago, including fever, non-productive cough, and difficulty sleeping.  - Physical examination revealed clear lungs and nasal swelling; COVID-19 test returned positive.  - Discussed the use of Paxlovid to reduce the severity and duration of symptoms; advised on supportive care measures.  - Paxlovid prescribed; recommended Nasonex nasal spray, Mucinex, and NyQuil for symptom relief; instructed to stay home until feeling significantly better.        Natalia Balderas,      This medical note was created with the assistance of artificial intelligence (AI) for documentation purposes. The content has been reviewed and confirmed by the healthcare provider for accuracy and completeness. Patient consented to the use of audio recording and use of AI during their visit.

## 2025-07-08 ENCOUNTER — APPOINTMENT (OUTPATIENT)
Dept: RADIOLOGY | Facility: CLINIC | Age: 45
End: 2025-07-08
Payer: COMMERCIAL

## 2025-07-15 ENCOUNTER — APPOINTMENT (OUTPATIENT)
Dept: RADIOLOGY | Facility: CLINIC | Age: 45
End: 2025-07-15
Payer: COMMERCIAL

## 2025-07-20 ENCOUNTER — PATIENT MESSAGE (OUTPATIENT)
Dept: PRIMARY CARE | Facility: CLINIC | Age: 45
End: 2025-07-20
Payer: COMMERCIAL

## 2025-07-20 DIAGNOSIS — R10.9 ABDOMINAL DISCOMFORT: Primary | ICD-10-CM

## 2025-08-17 DIAGNOSIS — E11.9 TYPE 2 DIABETES MELLITUS WITHOUT COMPLICATION, WITHOUT LONG-TERM CURRENT USE OF INSULIN: ICD-10-CM

## 2025-08-17 DIAGNOSIS — K76.0 HEPATIC STEATOSIS: ICD-10-CM
